# Patient Record
Sex: MALE | Race: OTHER | HISPANIC OR LATINO | URBAN - METROPOLITAN AREA
[De-identification: names, ages, dates, MRNs, and addresses within clinical notes are randomized per-mention and may not be internally consistent; named-entity substitution may affect disease eponyms.]

---

## 2023-10-04 ENCOUNTER — PHYSICAL THERAPY (OUTPATIENT)
Dept: PHYSICAL THERAPY | Facility: REHABILITATION | Age: 38
End: 2023-10-04
Attending: PHYSICAL MEDICINE & REHABILITATION
Payer: COMMERCIAL

## 2023-10-04 DIAGNOSIS — M54.16 RADICULOPATHY, LUMBAR REGION: ICD-10-CM

## 2023-10-04 DIAGNOSIS — M51.36 OTHER INTERVERTEBRAL DISC DEGENERATION, LUMBAR REGION: ICD-10-CM

## 2023-10-04 DIAGNOSIS — M79.18 MYALGIA, OTHER SITE: ICD-10-CM

## 2023-10-04 PROCEDURE — 97162 PT EVAL MOD COMPLEX 30 MIN: CPT

## 2023-10-04 PROCEDURE — 97110 THERAPEUTIC EXERCISES: CPT

## 2023-10-04 ASSESSMENT — ENCOUNTER SYMPTOMS
PAIN SCALE AT LOWEST: 0
PAIN SCALE: 5
PAIN SCALE AT HIGHEST: 10
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
## 2023-10-11 ENCOUNTER — PHYSICAL THERAPY (OUTPATIENT)
Dept: PHYSICAL THERAPY | Facility: REHABILITATION | Age: 38
End: 2023-10-11
Attending: PHYSICAL MEDICINE & REHABILITATION
Payer: COMMERCIAL

## 2023-10-11 DIAGNOSIS — M51.36 OTHER INTERVERTEBRAL DISC DEGENERATION, LUMBAR REGION: ICD-10-CM

## 2023-10-11 PROCEDURE — 97110 THERAPEUTIC EXERCISES: CPT

## 2023-10-11 NOTE — OP THERAPY DAILY TREATMENT
"  Outpatient Physical Therapy  DAILY TREATMENT     [facility] [address].  [address]  [place] [address]  Phone:  [phone]  Fax:  [phone]    Date: 10/11/2023    Patient: Gaetano MCCONNELL  YOB: 1985  MRN: [id number]     Time Calculation    Start time: 1017  Stop time: 1101 Time Calculation (min): 44 minutes         Chief Complaint: Back Problem    Visit #: 2    SUBJECTIVE:  Pt reports LBP is slightly improved overall, but is more sore and stiff today from working extra hours. Reports no NT in RLE today, but did have onset yesterday while sitting and leaning forward while eating food.     Aggs: forward flexion  Easers: Standing/walking  Irritability: low      OBJECTIVE:  Current objective measures:   Negative Mckenna's  1 beat of clonus bilaterally    DTR:  L4: 3+ bilat  S1: 2+ bilat          Therapeutic Exercises (CPT 00014):     1. Prone on elbows, x2'    2. Supine sciatic nerve glide, x10, demo improved l/s ext - added to HEP    3. Standing lumbar extensions w/ self OP, 15x5\" hold    4. SB bridge, 2x fatigue, (1:46)    5. SB superman, to fatigue, added to HEP    6. Seated sciatic nerve glide with l/s ext, x4', added to HEP      Therapeutic Exercise Summary: HEP:  Lumbar extensions, sciatic nerve glides, SB superman      Time-based treatments/modalities:    Physical Therapy Timed Treatment Charges  Therapeutic exercise minutes (CPT 29683): 44 minutes        ASSESSMENT:   Response to treatment: Pt demo positive response to neurodynamic activities with improved lumbar extension by end of session. Progressed strength and stability exercises with focus on multifidi engagement. Pt tolerated exercises well and without pain. Demo improved extensor endurance. Pt is progressing at this time and appropriate to cont PT.    PLAN/RECOMMENDATIONS:   Plan for treatment: therapy treatment to continue next visit.  Planned interventions for next visit: continue with current " treatment.

## 2023-10-16 ENCOUNTER — APPOINTMENT (OUTPATIENT)
Dept: PHYSICAL THERAPY | Facility: REHABILITATION | Age: 38
End: 2023-10-16
Attending: PHYSICAL MEDICINE & REHABILITATION
Payer: COMMERCIAL

## 2023-10-18 ENCOUNTER — PHYSICAL THERAPY (OUTPATIENT)
Dept: PHYSICAL THERAPY | Facility: REHABILITATION | Age: 38
End: 2023-10-18
Attending: PHYSICAL MEDICINE & REHABILITATION
Payer: COMMERCIAL

## 2023-10-18 DIAGNOSIS — M54.16 RADICULOPATHY, LUMBAR REGION: ICD-10-CM

## 2023-10-18 DIAGNOSIS — M51.36 OTHER INTERVERTEBRAL DISC DEGENERATION, LUMBAR REGION: ICD-10-CM

## 2023-10-18 PROCEDURE — 97110 THERAPEUTIC EXERCISES: CPT

## 2023-10-18 PROCEDURE — 97112 NEUROMUSCULAR REEDUCATION: CPT

## 2023-10-18 NOTE — OP THERAPY DAILY TREATMENT
Outpatient Physical Therapy  DAILY TREATMENT     [facility] [address].  [address]  [place] [address]  Phone:  [phone]  Fax:  [phone]    Date: 10/18/2023    Patient: Gaetano MCCONNELL  YOB: 1985  MRN: [id number]     Time Calculation    Start time: 1018  Stop time: 1100 Time Calculation (min): 42 minutes         Chief Complaint: Back Problem    Visit #: 3    SUBJECTIVE:  Pt reports back pain is reduced today, but has not been working as strenuously. Has not been completing HEP exercises much over the past week. Also reports getting a new    OBJECTIVE:  Current objective measures:   L shift noted in standing lumbar flexion  Positive L side SLR          Therapeutic Exercises (CPT 20700):       Therapeutic Exercise Summary: HEP:  Lumbar extensions, sciatic nerve glides, SB superman    Therapeutic Treatments and Modalities:     1. Manual Therapy (CPT 29819), L1-5 L UPA/CPA gd 3    2. Neuromuscular Re-education (CPT 11547)    Therapeutic Treatment and Modalities Summary: NMR:   -Supine sciatic nerve glide, x10 ea side  -Standing hip/trunk dissociation rotations with BUE support for external feedback    Core/extensor activation progressions - in supine  -Anterior/posterior pelvic tilts without flaring ribs, x5' - w/ added tactile cueing  -Maintaining extensor/TA engagement with KFO, x5' - demo difficulty maintaining anterior pelvic tilt >3 seconds    Time-based treatments/modalities:    Physical Therapy Timed Treatment Charges  Neuromusc re-ed, balance, coor, post minutes (CPT 86915): 27 minutes  Therapeutic exercise minutes (CPT 18940): 15 minutes        ASSESSMENT:   Response to treatment: Pt responded well to neurodynamic exercises with decreased L shift in flexion by end of session. Progressed focus on motor control of deep stabilizers. Pt demo difficulty isolating pelvic movements, instead control lumbar ROM with rib cage and thoracic movements (top down).  Demo good carryover throughout session. Pt is progressing at this time and appropriate to cont PT.    PLAN/RECOMMENDATIONS:   Plan for treatment: therapy treatment to continue next visit.  Planned interventions for next visit: continue with current treatment.

## 2023-10-23 ENCOUNTER — APPOINTMENT (OUTPATIENT)
Dept: PHYSICAL THERAPY | Facility: REHABILITATION | Age: 38
End: 2023-10-23
Attending: PHYSICAL MEDICINE & REHABILITATION
Payer: COMMERCIAL

## 2023-10-23 NOTE — OP THERAPY DAILY TREATMENT
Outpatient Physical Therapy  DAILY TREATMENT     [facility] [address].  [address]  [place] [address]  Phone:  [phone]  Fax:  [phone]    Date: 10/23/2023    Patient: Gaetano MCCONNELL  YOB: 1985  MRN: [id number]     Time Calculation                   Chief Complaint: No chief complaint on file.    Visit #: 4    SUBJECTIVE:  ***    OBJECTIVE:  Current objective measures:           Therapeutic Exercises (CPT 96557):       Therapeutic Exercise Summary: HEP:  Lumbar extensions, sciatic nerve glides, SB superman    Therapeutic Treatments and Modalities:     1. Manual Therapy (CPT 93299), L1-5 L UPA/CPA gd 3    2. Neuromuscular Re-education (CPT 92728)    Therapeutic Treatment and Modalities Summary: NMR:   -Supine sciatic nerve glide, x10 ea side  -Standing hip/trunk dissociation rotations with BUE support for external feedback    Core/extensor activation progressions - in supine  -Anterior/posterior pelvic tilts without flaring ribs, x5' - w/ added tactile cueing  -Maintaining extensor/TA engagement with KFO, x5' - demo difficulty maintaining anterior pelvic tilt >3 seconds    Time-based treatments/modalities:           ASSESSMENT:   Response to treatment: ***    PLAN/RECOMMENDATIONS:   Plan for treatment: therapy treatment to continue next visit.  Planned interventions for next visit: continue with current treatment.

## 2023-10-25 ENCOUNTER — PHYSICAL THERAPY (OUTPATIENT)
Dept: PHYSICAL THERAPY | Facility: REHABILITATION | Age: 38
End: 2023-10-25
Attending: PHYSICAL MEDICINE & REHABILITATION
Payer: COMMERCIAL

## 2023-10-25 DIAGNOSIS — M54.16 RADICULOPATHY, LUMBAR REGION: ICD-10-CM

## 2023-10-25 DIAGNOSIS — M51.36 OTHER INTERVERTEBRAL DISC DEGENERATION, LUMBAR REGION: ICD-10-CM

## 2023-10-25 PROCEDURE — 97110 THERAPEUTIC EXERCISES: CPT

## 2023-10-25 NOTE — OP THERAPY DAILY TREATMENT
Outpatient Physical Therapy  DAILY TREATMENT     [facility] Physical [address].  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         OBJECTIVE:  Current objective measures:   R hip: + FIRst, decreased flexion bilaterally          Therapeutic Exercises (CPT 20182):     1. Seated sciatic nerve tension, back arched, x10 ea, Demo improved flexion ROM    2. Seated sciatic nerve glide, slump, x10 ea, Demo improved flexion ROM    3. Standing slump sciatic nerve glide, x5, Painful in R hip    4. Qped rockers in hip ER/IR, x5'    5. Perry stretch, x2' ea, added to HEP    6. Hip opener IR/ER stretch, x1' ea, added to HEP      Therapeutic Exercise Summary: HEP:  Lumbar extensions, sciatic nerve glides, SB superman    Therapeutic Treatments and Modalities:     2. Neuromuscular Re-education (CPT 09310)    Therapeutic Treatment and Modalities Summary: Not today  -Standing hip/trunk dissociation rotations with BUE support for external feedback  Core/extensor activation progressions - in supine  -Anterior/posterior pelvic tilts without flaring ribs, x5' - w/ added tactile cueing  -Maintaining extensor/TA engagement with KFO, x5' - demo difficulty maintaining anterior pelvic tilt >3 seconds    Time-based treatments/modalities:    Physical Therapy Timed Treatment Charges  Therapeutic exercise minutes (CPT 75474): 46 minutes        ASSESSMENT:   Response to treatment: Pt demo improved ROM, improved flexion tolerance since last visit. Continued focus on neurodynamics. Pt demo positive response with improved  flexion ROM by end of session. Assessed hip d/t reports of pain, reported hx of OA. Pt demo s/s consistent with JOEL, potentially affecting back pain symptoms. Progressed hip mobility exercises. Pt able to tolerate without increased pain. Pt is progressing at this time and appropriate to cont PT.    PLAN/RECOMMENDATIONS:   Plan for treatment: therapy treatment to continue next visit.  Planned interventions for next visit: continue with current treatment.

## 2023-10-30 ENCOUNTER — APPOINTMENT (OUTPATIENT)
Dept: PHYSICAL THERAPY | Facility: REHABILITATION | Age: 38
End: 2023-10-30
Attending: PHYSICAL MEDICINE & REHABILITATION
Payer: COMMERCIAL

## 2023-11-01 ENCOUNTER — PHYSICAL THERAPY (OUTPATIENT)
Dept: PHYSICAL THERAPY | Facility: REHABILITATION | Age: 38
End: 2023-11-01
Attending: PHYSICAL MEDICINE & REHABILITATION
Payer: COMMERCIAL

## 2023-11-01 DIAGNOSIS — M51.36 OTHER INTERVERTEBRAL DISC DEGENERATION, LUMBAR REGION: ICD-10-CM

## 2023-11-01 PROCEDURE — 97110 THERAPEUTIC EXERCISES: CPT

## 2023-11-01 NOTE — OP THERAPY DISCHARGE SUMMARY
Outpatient Physical Therapy  DISCHARGE SUMMARY NOTE      [facility] [address].  [address]  [place] [address]  Phone:  [phone]  Fax:  [phone]    Date of Visit: 11/01/2023    Patient: Gaetano MCCONNELL  YOB: 1985  MRN: [id number]     Referring Provider: Bay Cline M.D.  [address] [facility] [place] [address]   Referring Diagnosis Other intervertebral disc degeneration, lumbar region [M51.36];Radiculopathy, lumbar region [M54.16];Myalgia, other site [M79.18]         Functional Assessment Used  Oswestry Low Back Pain Disability Total Score: 12     Your patient is being discharged from Physical Therapy with the following comments:   Goals met    Comments:  Pt has attended 5 visits from 10/4/23 to 11/1/23. Pt has met all functional goals and is appropriate for DC. Reviewed HEP. Pt demo full independence and proper mechanics of all exercises. Discussed return to running program, starting with short distances, progressing as tolerated. Continue focus on decreasing load with shortened stride, decreased vertical bouncing. Pt gave verbal understanding.    Short Term Goals:   1. Pt will be independent with HEP 3-5x per week for improving strength, ROM and decreasing pain - met  2. Pt will demo proper squat mechanics in order to  objects from the ground - met  Short term goal time span:  2-4 weeks      Long Term Goals:    1. Pt will demo lumbar ROM WNL in order to perform ADLs with pain 2/10 or less - met  2. Pt will demo bridge isometric 2 minutes or better for improved stabilty and decreased pain - met  3. Pt will report ability to tolerate sitting in car x 60 minutes with pain 2/10 or less in order to drive to work - met  4. Pt will reports ability to complete work shift with pain 3/10 or less - met  Long term goal time span:  4-6 weeks         Recommendations:  Continue with HEP    Juanita Ayala, PT, DPT    Date: 11/1/2023

## 2023-11-01 NOTE — OP THERAPY DAILY TREATMENT
"  Outpatient Physical Therapy  DAILY TREATMENT     [facility] [address].  [address]  [place] [address]  Phone:  [phone]  Fax:  [phone]    Date: 11/01/2023    Patient: Gaetano MCCONNELL  YOB: 1985  MRN: [id number]     Time Calculation    Start time: 1016  Stop time: 1100 Time Calculation (min): 44 minutes         Chief Complaint: Back Problem    Visit #: 5    SUBJECTIVE:  Pt reports some increased LBP this week from standing more at work. Is able to reduce symptoms with exercises and massage gun. Feels 90% WNL, would like today to be last visit.     Aggs: standing > 1 hour  Easers: sciatic nerve glide, extension  Irritability: low    OBJECTIVE:  Current objective measures:   Oswestry Low Back Pain Disability Total Score: 12       Therapeutic Exercises (CPT 73215):     1. Seated sciatic nerve tension, x10 ea    2. Tall wall w/ OH reach, 3x fatigue    3. Wall angels, 4x fatigue    4. 1/2 kneel HF stretch, 2x30\"    5. Rotisserie planks, x6 ea position    6. Running treadmil, x5', at self-selected speed      Therapeutic Exercise Summary: HEP:  Lumbar extensions, sciatic nerve glides, SB superman      Time-based treatments/modalities:    Physical Therapy Timed Treatment Charges  Therapeutic exercise minutes (CPT 90805): 44 minutes      ASSESSMENT:   Response to treatment: See DC note    PLAN/RECOMMENDATIONS:   Plan for treatment: discharge patient due to accomplished goals.           "